# Patient Record
Sex: FEMALE | Race: WHITE | NOT HISPANIC OR LATINO | URBAN - METROPOLITAN AREA
[De-identification: names, ages, dates, MRNs, and addresses within clinical notes are randomized per-mention and may not be internally consistent; named-entity substitution may affect disease eponyms.]

---

## 2019-02-14 NOTE — PATIENT DISCUSSION
BLEPHARITIS, OU: PRESCRIBE WARM COMPRESSES AND EYELID SCRUBS QD-BID, ARTIFICIAL TEARS BID-QID, RETURN FOR FOLLOW-UP AS SCHEDULED.

## 2019-02-14 NOTE — PATIENT DISCUSSION
Progressive - Daily wearODplano+0.5030+2.225466/25 -2&nbsp;SN &nbsp; &nbsp; hah 57 year old female with Lupus diagnosed in April 2017, Lupus nephritis with ESRD on HD (M/W/F), NSTEMI in 1/2018, DM2, HTN, HLD, Hypothyroidism presented from dialysis center with AMS.     s/p RTX on 1/14/18 and was on prednisone 50 mg daily on admission.    Recommend:  -In order to r/o infectious causes of AMS and now with fevers, would need LP to exclude meningitis.  -In CSF would check for counts, glucose, protein, CSF PCR panel, MAGUI virus PCR, AFB, Crypt ag.  -Crypt ag in serum neg  -May need repeat MRI Brain when patient more able to tolerate.  -Consider Neurology input.  -Blood cxs thus far no growth to date  -F/U Wound care consult for left buttock decubitus ulcer  -Continue vanco by level and meropenem pending workup.  -MRI spine negative for OM - F/U MRI left buttock region to r/o om.    Silvio Lora MD  Pager [phone]  After 5pm/weekends call [phone]

## 2022-08-12 ENCOUNTER — COMPREHENSIVE EXAM (OUTPATIENT)
Dept: URBAN - METROPOLITAN AREA CLINIC 16 | Facility: CLINIC | Age: 57
End: 2022-08-12

## 2022-08-12 DIAGNOSIS — H40.013: ICD-10-CM

## 2022-08-12 DIAGNOSIS — H52.03: ICD-10-CM

## 2022-08-12 DIAGNOSIS — Z01.00: ICD-10-CM

## 2022-08-12 DIAGNOSIS — H52.4: ICD-10-CM

## 2022-08-12 PROCEDURE — 92014 COMPRE OPH EXAM EST PT 1/>: CPT

## 2022-08-12 PROCEDURE — 92015 DETERMINE REFRACTIVE STATE: CPT

## 2022-08-12 ASSESSMENT — TONOMETRY
OD_IOP_MMHG: 19
OS_IOP_MMHG: 19

## 2022-08-12 ASSESSMENT — KERATOMETRY
OD_K1POWER_DIOPTERS: 41.00
OS_AXISANGLE2_DEGREES: 173
OD_AXISANGLE_DEGREES: 68
OS_K2POWER_DIOPTERS: 41.25
OS_AXISANGLE_DEGREES: 83
OS_K1POWER_DIOPTERS: 40.75
OD_K2POWER_DIOPTERS: 41.25
OD_AXISANGLE2_DEGREES: 158

## 2023-08-07 ASSESSMENT — KERATOMETRY
OD_K1POWER_DIOPTERS: 41.00
OD_K2POWER_DIOPTERS: 41.25
OS_AXISANGLE_DEGREES: 83
OS_AXISANGLE2_DEGREES: 173
OD_AXISANGLE2_DEGREES: 158
OD_AXISANGLE_DEGREES: 68
OS_K2POWER_DIOPTERS: 41.25
OS_K1POWER_DIOPTERS: 40.75

## 2023-08-14 ENCOUNTER — COMPREHENSIVE EXAM (OUTPATIENT)
Dept: URBAN - METROPOLITAN AREA CLINIC 16 | Facility: CLINIC | Age: 58
End: 2023-08-14

## 2023-08-14 DIAGNOSIS — Z01.00: ICD-10-CM

## 2023-08-14 DIAGNOSIS — H52.03: ICD-10-CM

## 2023-08-14 DIAGNOSIS — H52.223: ICD-10-CM

## 2023-08-14 DIAGNOSIS — H40.013: ICD-10-CM

## 2023-08-14 DIAGNOSIS — H52.4: ICD-10-CM

## 2023-08-14 PROCEDURE — 92014 COMPRE OPH EXAM EST PT 1/>: CPT

## 2023-08-14 PROCEDURE — 92015 DETERMINE REFRACTIVE STATE: CPT

## 2023-08-14 ASSESSMENT — TONOMETRY
OD_IOP_MMHG: 19
OS_IOP_MMHG: 19

## 2023-08-14 ASSESSMENT — VISUAL ACUITY
OD_SC: 20/20-1
OS_SC: 20/20-1

## 2024-08-12 ASSESSMENT — KERATOMETRY
OS_AXISANGLE_DEGREES: 83
OD_K2POWER_DIOPTERS: 41.25
OS_K2POWER_DIOPTERS: 41.25
OD_AXISANGLE2_DEGREES: 158
OS_K1POWER_DIOPTERS: 40.75
OD_K1POWER_DIOPTERS: 41.00
OD_AXISANGLE_DEGREES: 68
OS_AXISANGLE2_DEGREES: 173

## 2024-08-19 ENCOUNTER — COMPREHENSIVE EXAM (OUTPATIENT)
Dept: URBAN - METROPOLITAN AREA CLINIC 16 | Facility: CLINIC | Age: 59
End: 2024-08-19

## 2024-08-19 DIAGNOSIS — H52.03: ICD-10-CM

## 2024-08-19 DIAGNOSIS — Z01.00: ICD-10-CM

## 2024-08-19 DIAGNOSIS — H52.223: ICD-10-CM

## 2024-08-19 DIAGNOSIS — H40.013: ICD-10-CM

## 2024-08-19 DIAGNOSIS — H52.4: ICD-10-CM

## 2024-08-19 PROCEDURE — 92014 COMPRE OPH EXAM EST PT 1/>: CPT

## 2024-08-19 PROCEDURE — 92015 DETERMINE REFRACTIVE STATE: CPT

## 2024-08-19 ASSESSMENT — VISUAL ACUITY
OD_SC: 20/20-1
OS_SC: 20/20

## 2024-08-19 ASSESSMENT — TONOMETRY
OS_IOP_MMHG: 22
OD_IOP_MMHG: 22